# Patient Record
Sex: FEMALE | NOT HISPANIC OR LATINO | Employment: FULL TIME | URBAN - METROPOLITAN AREA
[De-identification: names, ages, dates, MRNs, and addresses within clinical notes are randomized per-mention and may not be internally consistent; named-entity substitution may affect disease eponyms.]

---

## 2022-08-09 ENCOUNTER — VIRTUAL VISIT (OUTPATIENT)
Dept: CARDIOLOGY | Facility: CLINIC | Age: 32
End: 2022-08-09
Attending: GENETIC COUNSELOR, MS
Payer: COMMERCIAL

## 2022-08-09 DIAGNOSIS — Z84.81 FAMILY HISTORY OF GENE MUTATION: Primary | ICD-10-CM

## 2022-08-09 DIAGNOSIS — Z82.49 FAMILY HISTORY OF CARDIOMYOPATHY: ICD-10-CM

## 2022-08-09 PROCEDURE — 96040 HC GENETIC COUNSELING, EACH 30 MINUTES: CPT | Mod: GT | Performed by: GENETIC COUNSELOR, MS

## 2022-08-09 NOTE — LETTER
2022      RE: Lorna Echeverria  [address] [address]       Dear Colleague,    Thank you for the opportunity to participate in the care of your patient, Lorna Echeverria, at the [facility] [place] at [facility]. Please see a copy of my visit note below.    PROVIDER APPOINTMENT  Here is a copy of the progress note from your recent genetic counseling visit through the [facility] on Date: 2022.  Due to Covid-19 pandemic, this appointment was moved to a virtual visit.     PROGRESS NOTE:Lorna was seen for genetic counseling due to her family history of nonischemic cardiomyopathy (NICM).  I had the opportunity to talk with Lorna, her mom Fay and grandmotherJoan today to discuss the genetic component of NICM and testing options available to her.      MEDICAL HISTORY:Lorna reports that she is in good health.  She has not had any cardiac screening at this time.    FAMILY HISTORY:A detailed family history was obtained at other family members' visits and updated during today's consult.  Family history was significant for the following cardiac history:    Grandmother Mecca diagnosed with cardiomyopathy and found to carry TTN mutation.     Grandma's sister Cheri recently had genetic testing and was found to carry TTN mutation (c.33067) and vus.  She does NOT carry the ACTN2 vus.    Grandma's brother Kenneth was diagnosed with nonischemic dilated cardiomyopathy (DCM).  Genetic testing was performed and revealed a mutation in the TTN gene. He was also found to carry two variants of unknown significance in the TTN gene and the ACTN2 gene.    Grandmother's sister Keila also recently found to carry the TTN mutation.    Grandmother had a brother who  at 79 years with cardiomyopathy.  Another brother  in 2018 with cancer.    Grandmother three other siblins tested neg for TTN  mutation.    Mother with developmental delay, anemia, ulcers, hypothyroidism, borderline cholesterol, and prediabetic.    A brother and two children in good health.    There is no additional history of cardiomyopathy, arrhythmias, heart attacks, fainting, sudden cardiac death, genetic conditions, or birth defects. (A copy of pedigree may be found under media tab).     DISCUSSION: Reviewed diagnosis of dilated cardiomyopathy (DCM) found in the family. Explained that gene mutations are found in 40-50% of patients with DCM.  Genetic testing was previously performed in this family and identified a mutation in the TTN gene     Mutations in the TTN gene are inherited in an autosomal dominant (AD) pattern. Reviewed AD inheritance. Explained that most AD cardiac mutations are inherited from a parent, therefore it is appropriate to offer genetic testing in parents,siblings, and children.  Each of those family members has a 50% risk of carrying the same gene. Explained variable expressivity and reduced penetrance which can help explain why a condition can be genetic even when there is no apparent family history.      Lorna understands that if she is found to carry a mutation, she will be at increased risk for developing the disease and each of her children will have a 50% risk of carrying the mutation as well. If Lorna does NOT carry the familial mutation, she will not be at increased risk for developing this form of the disease and her children will NOT be at risk for developing this form of DCM.      If Lorna carries the mutation, we will recommend a thorough clinical cardiac evaluation and then decide on treatment plans.     Reviewed logistics, capabilities and limitations of genetic testing. DNA sample via saliva or blood is collected and sent to testing lab for evaluation of selected genes. Turn around time for results of 2-4 weeks. Reviewed cost of testing thru commercial lab. Explained that testing should be covered  through the free family testing option offered by [facility].     Discussed pros and cons of genetic testing. Explained that results could significantly impact management and treatment decisions. Reviewed possible issues associated with presymptomatic testing including genetic discrimination, current laws to prevent discrimination (ie. SHARON), insurance issues, and emotional and psychosocial outcomes of testing.      Recommend clinical evaluation for all first degree relatives (parents, siblings, and children) of an affected individual regardless of decision to pursue genetic testing. Clinical evaluation needs to be performed on an ongoing basis and should include history, cardiac exam, ECHO, EKG, Holter monitoring, and exercise treadmill.       All questions answered at this time.     PLAN:Lorna elected to proceed with genetic testing at the same time as her mother so she can qualify for the free testing.  Requisition and consent forms were completed and signed.  DNA will be collected via saliva sample and sent to [facility] laboratory. I will contact patient when results are available.     TOTAL TIME SPENT IN COUNSELIN minutes     Ana Yan MS, [facility]  Licensed, Certified Genetic Counselor  [facility]       Please do not hesitate to contact me if you have any questions/concerns.     Sincerely,     Ana Yan GC

## 2022-08-10 NOTE — PROGRESS NOTES
PROVIDER APPOINTMENT  Here is a copy of the progress note from your recent genetic counseling visit through the [facility] on Date: 2022.  Due to Covid-19 pandemic, this appointment was moved to a virtual visit.     PROGRESS NOTE:Lorna was seen for genetic counseling due to her family history of nonischemic cardiomyopathy (NICM).  I had the opportunity to talk with Lorna, her mom Fay and grandmotherJoan today to discuss the genetic component of NICM and testing options available to her.      MEDICAL HISTORY:Lorna reports that she is in good health.  She has not had any cardiac screening at this time.    FAMILY HISTORY:A detailed family history was obtained at other family members' visits and updated during today's consult.  Family history was significant for the following cardiac history:    Grandmother Mecca diagnosed with cardiomyopathy and found to carry TTN mutation.     Grandma's sister Cheri recently had genetic testing and was found to carry TTN mutation (c.39384) and vus.  She does NOT carry the ACTN2 vus.    Grandma's brother Kenneth was diagnosed with nonischemic dilated cardiomyopathy (DCM).  Genetic testing was performed and revealed a mutation in the TTN gene. He was also found to carry two variants of unknown significance in the TTN gene and the ACTN2 gene.    Grandmother's sister Keila also recently found to carry the TTN mutation.    Grandmother had a brother who  at 79 years with cardiomyopathy.  Another brother  in 2018 with cancer.    Grandmother three other siblins tested neg for TTN mutation.    Mother with developmental delay, anemia, ulcers, hypothyroidism, borderline cholesterol, and prediabetic.    A brother and two children in good health.    There is no additional history of cardiomyopathy, arrhythmias, heart attacks, fainting, sudden cardiac death, genetic conditions, or birth defects. (A copy of pedigree may be found under media  tab).     DISCUSSION: Reviewed diagnosis of dilated cardiomyopathy (DCM) found in the family. Explained that gene mutations are found in 40-50% of patients with DCM.  Genetic testing was previously performed in this family and identified a mutation in the TTN gene     Mutations in the TTN gene are inherited in an autosomal dominant (AD) pattern. Reviewed AD inheritance. Explained that most AD cardiac mutations are inherited from a parent, therefore it is appropriate to offer genetic testing in parents,siblings, and children.  Each of those family members has a 50% risk of carrying the same gene. Explained variable expressivity and reduced penetrance which can help explain why a condition can be genetic even when there is no apparent family history.      Lorna understands that if she is found to carry a mutation, she will be at increased risk for developing the disease and each of her children will have a 50% risk of carrying the mutation as well. If Lorna does NOT carry the familial mutation, she will not be at increased risk for developing this form of the disease and her children will NOT be at risk for developing this form of DCM.      If Lorna carries the mutation, we will recommend a thorough clinical cardiac evaluation and then decide on treatment plans.     Reviewed logistics, capabilities and limitations of genetic testing. DNA sample via saliva or blood is collected and sent to testing lab for evaluation of selected genes. Turn around time for results of 2-4 weeks. Reviewed cost of testing thru commercial lab. Explained that testing should be covered through the free family testing option offered by [employer].     Discussed pros and cons of genetic testing. Explained that results could significantly impact management and treatment decisions. Reviewed possible issues associated with presymptomatic testing including genetic discrimination, current laws to prevent discrimination (ie. SHARON), insurance issues,  and emotional and psychosocial outcomes of testing.      Recommend clinical evaluation for all first degree relatives (parents, siblings, and children) of an affected individual regardless of decision to pursue genetic testing. Clinical evaluation needs to be performed on an ongoing basis and should include history, cardiac exam, ECHO, EKG, Holter monitoring, and exercise treadmill.       All questions answered at this time.     PLAN:Lorna elected to proceed with genetic testing at the same time as her mother so she can qualify for the free testing.  Requisition and consent forms were completed and signed.  DNA will be collected via saliva sample and sent to [facility] laboratory. I will contact patient when results are available.     TOTAL TIME SPENT IN COUNSELIN minutes     Ana Yan MS, [facility]  Licensed, Certified Genetic Counselor  [facility]

## 2022-08-10 NOTE — PATIENT INSTRUCTIONS
Indication for Genetic Counseling:     Non-ischemic cardiomyopathy results from causes other than loss of blood flow to the heart.  Cardiomyopathy can be caused by both acquired and genetic causes.  Acquired causes include exposure to toxins, injury related to coronary artery disease (ischemic),  infections and inflammation of the heart (myocarditis), alcohol/drug abuse, stress, and chronic high blood pressure.  When familial, it often results in dilated cardiomyopathy (DCM), where the left ventricle gets enlarged or stretched out.  Dilated cardiomyopathy (DCM) is a condition that causes the heart to lose it's elasticity, which leads to stretching and dilation.  It is usually diagnosed by an echocardiogram (ECHO) or cardiac magnetic resonance imaging (MRI).  When the heart becomes dilated, it cannot pump blood as effectively, which can lead to symptoms such as congestive heart failure, fluid accumulation in the body (edema), shortness of breath, fatigue, irregular heartbeat, fainting, stroke, cardiac arrest, and sudden cardiac death (SCD).   There are at least 40 genes known to be associated with DCM.    Inheritance:   Humans have over 20,000 genes that instruct our bodies how to grow and function.  We have two copies of each gene because we inherit one from our mother and one from our father.  The condition in your family is inherited in an autosomal dominant (AD) pattern. Dominant means that only one copy of the gene needs to be broken (gene mutation or pathogenic variant).  Since most people with a dominant condition have one normal gene and one broken gene, the risk that other family members carry the same gene is increased.  Parents, siblings, and children have a 50% chance of inheriting the mutation.      Testing Options:   Genetic testing is performed to assess a panel of genes known to cause this condition.  The test reads through the DNA of these genes (sequencing) to look for spelling mistakes or mutations  that could cause the condition.      Genetic testing previously performed in your family member identified a gene mutation.  Therefore, instead of looking at all genes associated with this condition, this genetic test will look for the specific mutation(s) found in your family member(s).  If the familial mutation(s) is detected, you are at increased risk to develop the condition and should follow the recommended screening guidelines provided by a cardiologist.  If the mutation is not detected, you are not at increased risk to develop the condition based on your genetic results.      Although we predict everyone who carries a mutation is at increased risk for developing the condition, we cannot predict age of onset or severity of symptoms due to reduced penetrance and variable expressivity.    Logistics:   Genetic testing involves collecting a sample of DNA, thru blood, saliva, or cheek cells.    The sample will be sent to a laboratory to extract the DNA and sequence the genes for mutations.  The laboratory will work with your insurance company to determine the out of pocket (OOP) cost and will notify you if the OOP cost is greater than $100.  Remember to ask the lab about financial assistance pricing and self pay options as well.  Sometimes those are much lower than insurance pricing.  When testing is initiated, results take about 2-4 weeks to return. I will contact you over the phone when results are available.     Genetic Information and Nondiscrimination Act:  The Genetic Information and Nondiscrimination Act of 2008 (SHARON) is a federal law that protects individuals from genetic discrimination in health insurance and employment. Genetic discrimination is defined as the misuse of genetic information. This law does not address potential discrimination regarding life insurance or disability insurance.      This is especially relevant for at risk individuals who are considering presymptomatic testing.    Screening  Recommendations:  Explained that clinical evaluation is recommended for all first degree relatives (parents, siblings, and children) of an affected individual regardless of decision to pursue genetic testing. Based on the Heart Failure Society of [place] Practice Guidelines (Laxmi et al, 2009), clinical evaluation should be performed at least every 3-5 years beginning and childhood and should include history, cardiac exam, ECHO, and EKG.    Resources:  Cardiomyopathy  Hypertrophic Cardiomyopathy Association - 4hcm.org  Children's Cardiomyopathy Foundation - childrenscardiomyopathy.org  [place] Cardiomyopathy Association - cardiomyopathy.org  Dilated Cardiomyopathy Foundation - DCMfoundation.org    Arrhythmia  Heart Rhythm Society - HRSonline.org    General   American Heart Association - americanheart.org  Genetics Home Reference - ghr.nlm.nih.gov  Genetic Information and Nondiscrimination Act - ginahelp.org    Contact Information:  Ana Yan MS  Licensed Genetic Counselor  [facility]  [facility]    Office:  [phone]  Appointments:  [phone]  Fax: [phone]  Email: [email or web address]

## 2022-09-07 ENCOUNTER — TELEPHONE (OUTPATIENT)
Dept: CARDIOLOGY | Facility: CLINIC | Age: 32
End: 2022-09-07

## 2022-09-07 NOTE — TELEPHONE ENCOUNTER
Spoke with Lorna today to review results of genetic testing.  She underwent genetic testing on August 22, 2022 due to the family history of dilated cardiomyopathy (DCM) and a known genetic mutation in her maternal grandmother and several other relatives.  Genetic testing for the familial mutation was sent to [facility] and revealed that Lorna does NOT carry a mutation in the TTN gene. This result significantly reduces, but does not eliminate, her  risk to develop dilated cardiomyopathy (DCM).  Based on these results, clinical screening is not recommended for Lorna at this time. However, she  should seek care if she  has any symptoms associated with CHF or other heart problems.   A summary letter and copy of the results will be sent to patient. All questions answered at this time.   Ana Yan MS, CGC  Licensed, Certified Genetic Counselor  [facility]  [facility]

## 2022-09-14 ENCOUNTER — TELEPHONE (OUTPATIENT)
Dept: CARDIOLOGY | Facility: CLINIC | Age: 32
End: 2022-09-14